# Patient Record
Sex: MALE | Race: BLACK OR AFRICAN AMERICAN
[De-identification: names, ages, dates, MRNs, and addresses within clinical notes are randomized per-mention and may not be internally consistent; named-entity substitution may affect disease eponyms.]

---

## 2018-07-09 LAB
ANION GAP SERPL CALC-SCNC: 12.3 MMOL/L (ref 8–16)
BASOPHILS # BLD AUTO: 0 10*3/UL (ref 0–0.1)
BASOPHILS NFR BLD AUTO: 0 % (ref 0–1)
BUN SERPL-MCNC: 10 MG/DL (ref 7–26)
BUN/CREAT SERPL: 10 (ref 6–25)
CALCIUM SERPL-MCNC: 9.5 MG/DL (ref 8.4–10.2)
CHLORIDE SERPL-SCNC: 103 MMOL/L (ref 98–107)
CO2 SERPL-SCNC: 27 MMOL/L (ref 22–29)
DEPRECATED NEUTROPHILS # BLD AUTO: 5.5 10*3/UL (ref 2.1–6.9)
EGFRCR SERPLBLD CKD-EPI 2021: > 60 ML/MIN (ref 60–?)
EOSINOPHIL # BLD AUTO: 0 10*3/UL (ref 0–0.4)
EOSINOPHIL NFR BLD AUTO: 0 % (ref 0–6)
ERYTHROCYTE [DISTWIDTH] IN CORD BLOOD: 11 % (ref 11.7–14.4)
GLUCOSE SERPLBLD-MCNC: 100 MG/DL (ref 74–118)
HCT VFR BLD AUTO: 37.6 % (ref 38.2–49.6)
HGB BLD-MCNC: 12.5 G/DL (ref 14–18)
LYMPHOCYTES # BLD: 1.3 10*3/UL (ref 1–3.2)
LYMPHOCYTES NFR BLD AUTO: 17.6 % (ref 18–39.1)
MCH RBC QN AUTO: 31.3 PG (ref 28–32)
MCHC RBC AUTO-ENTMCNC: 33.2 G/DL (ref 31–35)
MCV RBC AUTO: 94.2 FL (ref 81–99)
MONOCYTES # BLD AUTO: 0.8 10*3/UL (ref 0.2–0.8)
MONOCYTES NFR BLD AUTO: 10.1 % (ref 4.4–11.3)
NEUTS SEG NFR BLD AUTO: 72 % (ref 38.7–80)
PLATELET # BLD AUTO: 248 X10E3/UL (ref 140–360)
POTASSIUM SERPL-SCNC: 4.3 MMOL/L (ref 3.5–5.1)
RBC # BLD AUTO: 3.99 X10E6/UL (ref 4.3–5.7)
SODIUM SERPL-SCNC: 138 MMOL/L (ref 136–145)

## 2018-07-09 NOTE — DIAGNOSTIC IMAGING REPORT
PROCEDURE: X-RAY CHEST, TWO VIEWS

COMPARISON: None.

INDICATIONS: PRE OPERATIVE CHEST X-RAY BIOPSY OF PROSTATE

 

FINDINGS:



LUNGS: No consolidations or edema.

 

PLEURA: No effusions or pneumothorax.

 

HEART \T\ 

MEDIASTINUM: The heart is within normal size-limits.

 

BONES \T\

SOFT TISSUES:  No acute findings.

 

 

CONCLUSION:

No acute thoracic abnormality. 

 

 

 

Tayo Castillo D.O.  

Dictated by:  Tayo Castillo D.O. on 7/09/2018 at 10:05     

Electronically approved by:  Tayo Castillo D.O. on 7/09/2018 at 10:05

## 2018-07-10 ENCOUNTER — HOSPITAL ENCOUNTER (OUTPATIENT)
Dept: HOSPITAL 88 - OR | Age: 58
Discharge: HOME | End: 2018-07-10
Attending: UROLOGY
Payer: COMMERCIAL

## 2018-07-10 DIAGNOSIS — Z01.818: ICD-10-CM

## 2018-07-10 DIAGNOSIS — Z01.810: ICD-10-CM

## 2018-07-10 DIAGNOSIS — N43.3: ICD-10-CM

## 2018-07-10 DIAGNOSIS — Z01.812: ICD-10-CM

## 2018-07-10 DIAGNOSIS — C61: Primary | ICD-10-CM

## 2018-07-10 DIAGNOSIS — R85.618: ICD-10-CM

## 2018-07-10 PROCEDURE — 76872 US TRANSRECTAL: CPT

## 2018-07-10 PROCEDURE — 55700: CPT

## 2018-07-10 PROCEDURE — 76942 ECHO GUIDE FOR BIOPSY: CPT

## 2018-07-10 PROCEDURE — 80048 BASIC METABOLIC PNL TOTAL CA: CPT

## 2018-07-10 PROCEDURE — 93005 ELECTROCARDIOGRAM TRACING: CPT

## 2018-07-10 PROCEDURE — 71046 X-RAY EXAM CHEST 2 VIEWS: CPT

## 2018-07-10 PROCEDURE — 36415 COLL VENOUS BLD VENIPUNCTURE: CPT

## 2018-07-10 PROCEDURE — 85025 COMPLETE CBC W/AUTO DIFF WBC: CPT

## 2018-07-10 PROCEDURE — 88305 TISSUE EXAM BY PATHOLOGIST: CPT

## 2018-07-10 NOTE — OPERATIVE REPORT
DATE OF PROCEDURE:  July 10, 2018 





PREOPERATIVE DIAGNOSIS:  Elevated prostate-specific antigen with abnormal 

digital rectal examination.



POSTOPERATIVE DIAGNOSIS:  Elevated prostate-specific antigen with abnormal 

digital rectal examination.



PROCEDURE PERFORMED:  Transrectal ultrasound and biopsy of the prostate.



ANESTHESIA:  General anesthesia.



ESTIMATED BLOOD LOSS:  Minimal.



INDICATIONS:  Mr. Dipak Terry is a 57-year-old gentleman with history of 

an elevation of his PSA and a multinodular prostate.  He now presents for 

potential diagnosis of this problem.



PROCEDURE IN DETAIL:  The patient was brought into the operating room, 

placed in the left lateral decubitus position after administration of 

general anesthesia, was prepped and draped in the usual fashion.  A digital 

rectal examination revealed a gland approximately 30 to 40 grams in volume 

with multiple nodules noted on both sides of the gland.  The ultrasound 

probe was used to measure the gland, which measured 61 mL in volume.  The 

gland was heterogenous without any obvious localized hypoechoic lesions.  A 

total of 12 biopsies were taken in the standard fashion, 6 from each side, 

taking great care to incorporate biopsies from the base, mid, and apical 

portions of the gland.  These were sent to pathology for microscopic 

analysis.





At the conclusion of procedure, there was minimal bleeding noted and the 

patient was cleaned and returned to the supine position.  Anesthesia was 

reversed and he was transferred to a bed and taken to the postanesthesia 

care unit in good condition.  Of note, the needle and instrument count were 

correct at the conclusion of the case.









DD:  07/10/2018 12:19

DT:  07/10/2018 15:13

Job#:  [id number] MARLEY